# Patient Record
Sex: MALE | Race: OTHER
[De-identification: names, ages, dates, MRNs, and addresses within clinical notes are randomized per-mention and may not be internally consistent; named-entity substitution may affect disease eponyms.]

---

## 2021-03-25 ENCOUNTER — HOSPITAL ENCOUNTER (EMERGENCY)
Dept: HOSPITAL 72 - EMR | Age: 66
Discharge: HOME | End: 2021-03-25
Payer: COMMERCIAL

## 2021-03-25 VITALS — HEIGHT: 69 IN | WEIGHT: 180 LBS | BODY MASS INDEX: 26.66 KG/M2

## 2021-03-25 VITALS — DIASTOLIC BLOOD PRESSURE: 86 MMHG | SYSTOLIC BLOOD PRESSURE: 166 MMHG

## 2021-03-25 VITALS — SYSTOLIC BLOOD PRESSURE: 152 MMHG | DIASTOLIC BLOOD PRESSURE: 72 MMHG

## 2021-03-25 VITALS — DIASTOLIC BLOOD PRESSURE: 69 MMHG | SYSTOLIC BLOOD PRESSURE: 157 MMHG

## 2021-03-25 VITALS — DIASTOLIC BLOOD PRESSURE: 87 MMHG | SYSTOLIC BLOOD PRESSURE: 153 MMHG

## 2021-03-25 DIAGNOSIS — I10: Primary | ICD-10-CM

## 2021-03-25 LAB
ADD MANUAL DIFF: NO
ALBUMIN SERPL-MCNC: 4.1 G/DL (ref 3.4–5)
ALBUMIN/GLOB SERPL: 1.2 {RATIO} (ref 1–2.7)
ALP SERPL-CCNC: 105 U/L (ref 46–116)
ALT SERPL-CCNC: 30 U/L (ref 12–78)
ANION GAP SERPL CALC-SCNC: 9 MMOL/L (ref 5–15)
AST SERPL-CCNC: 17 U/L (ref 15–37)
BASOPHILS NFR BLD AUTO: 1.7 % (ref 0–2)
BILIRUB SERPL-MCNC: 0.5 MG/DL (ref 0.2–1)
BUN SERPL-MCNC: 14 MG/DL (ref 7–18)
CALCIUM SERPL-MCNC: 9.2 MG/DL (ref 8.5–10.1)
CHLORIDE SERPL-SCNC: 104 MMOL/L (ref 98–107)
CO2 SERPL-SCNC: 28 MMOL/L (ref 21–32)
CREAT SERPL-MCNC: 1 MG/DL (ref 0.55–1.3)
EOSINOPHIL NFR BLD AUTO: 2.6 % (ref 0–3)
ERYTHROCYTE [DISTWIDTH] IN BLOOD BY AUTOMATED COUNT: 14.9 % (ref 11.6–14.8)
GLOBULIN SER-MCNC: 3.5 G/DL
HCT VFR BLD CALC: 42.2 % (ref 42–52)
HGB BLD-MCNC: 13.8 G/DL (ref 14.2–18)
LYMPHOCYTES NFR BLD AUTO: 21.3 % (ref 20–45)
MCV RBC AUTO: 87 FL (ref 80–99)
MONOCYTES NFR BLD AUTO: 6.7 % (ref 1–10)
NEUTROPHILS NFR BLD AUTO: 67.7 % (ref 45–75)
PLATELET # BLD: 233 K/UL (ref 150–450)
POTASSIUM SERPL-SCNC: 3.8 MMOL/L (ref 3.5–5.1)
RBC # BLD AUTO: 4.86 M/UL (ref 4.7–6.1)
SODIUM SERPL-SCNC: 141 MMOL/L (ref 136–145)
WBC # BLD AUTO: 11 K/UL (ref 4.8–10.8)

## 2021-03-25 PROCEDURE — 84484 ASSAY OF TROPONIN QUANT: CPT

## 2021-03-25 PROCEDURE — 99284 EMERGENCY DEPT VISIT MOD MDM: CPT

## 2021-03-25 PROCEDURE — 36415 COLL VENOUS BLD VENIPUNCTURE: CPT

## 2021-03-25 PROCEDURE — 71045 X-RAY EXAM CHEST 1 VIEW: CPT

## 2021-03-25 PROCEDURE — 85025 COMPLETE CBC W/AUTO DIFF WBC: CPT

## 2021-03-25 PROCEDURE — 80053 COMPREHEN METABOLIC PANEL: CPT

## 2021-03-25 NOTE — DIAGNOSTIC IMAGING REPORT
Indication: Chest pain

 

Technique: XRAY Chest 1v

 

Comparison: None

 

Findings: Heart size within normal limits for AP technique. Mediastinal contours are

sharp. Atherosclerotic calcifications in the aorta. There is no focal airspace

consolidation, pneumothorax or pleural effusion. Osseous structures demonstrate no

acute abnormality.

 

Impression: No radiographic evidence of acute cardiopulmonary disease.

## 2021-03-25 NOTE — EMERGENCY ROOM REPORT
History of Present Illness


General


Chief Complaint:  Chest Pain


Source:  Patient





Present Illness


HPI


Disclaimer: Please note that this report is being documented using [employer] 

technology. This can lead to erroneous entry secondary to incorrect 

interpretation by the dictating instrument.





HPI: 65-year-old male presents for evaluation of elevated blood pressure 

readings at home.  He has a history of hypertension takes Benzapril daily.  Has 

not missed any doses.  His home blood pressure readings have been ranging in the

170 systolic and in the 80s diastolic.  He states that today while walking he 

felt a little bit lightheaded and tingling in his fingertips and over the front 

of his chest.  Denied chest pain, pressure, palpitations.  No syncope, no 

headache.  Patient was previously on lisinopril and amlodipine but changed to 

Benzapril by his PMD several weeks ago.  Yesterday his Benzapril was increased 

from 10 mg twice daily to 20 mg twice daily.  He drinks 1 cup of coffee daily.  

Denies alcohol, drug use.  Non-smoker.  Denies prior history of CAD, 

hyperlipidemia, diabetes or other medical ailments.  Currently he feels well.  





PMH: Hypertension


 


PSH: Denied


 


Allergies: Denied


 


Social Hx: Denies alcohol, tobacco or drug use


Allergies:  


Coded Allergies:  


     No Known Allergies (Unverified , 3/25/21)





COVID-19 Screening


Contact w/high risk pt:  No


Experienced COVID-19 symptoms?:  No


COVID-19 Testing performed PTA:  No





Nursing Documentation-PMH


Hx Hypertension:  Yes





Review of Systems


All Other Systems:  negative except mentioned in HPI





Physical Exam





Vital Signs








  Date Time  Temp Pulse Resp B/P (MAP) Pulse Ox O2 Delivery O2 Flow Rate FiO2


 


3/25/21 14:03 98.2 66 18 190/107 (134) 99 Room Air  





 





General: Awake and alert, no acute distress


HEENT: NC/AT. EOMI. 


Cardiovascular: RRR.  S1 and S2 normal.  No murmur appreciated


Resp: Normal work of breathing. No cough, wheezing or crackles appreciated


Abdomen: Abdomen is soft, nondistended.  Nontender


Skin: Intact.  No abrasions, laceration or rash over the exposed skin


MSK: Normal tone and bulk. Moving all extremities.  No obvious deformity.


Neuro: Awake and alert.  Mentating appropriately.





Medical Decision Making


Diagnostic Impression:  


   Primary Impression:  


   Hypertension


ER Course


Is a 65-year-old male presenting for elevated blood pressure.  Initially the 

patient was triaged saying he was having chest pain however on discussion he 

states that it was a tingling sensation in his hands and over his chest but this

was short-lived.  He denied any anginal type pain.  His EKG on arrival shows nor

mal sinus rhythm without signs of ischemia.  He arrives hypertensive with 

systolics in the 190s which improved into the 170 systolic on my evaluation.  He

was also given clonidine.  Labs including troponin are within normal limits.  

Chest x-ray unremarkable.  Patient's blood pressure improved.  Low risk 

according to heart score guidelines.  Will follow up with his PMD.  Encouraged 

him to continue keeping track of his blood pressures at home to discuss his 

blood pressures with his PMD to further adjust his medications as needed.  

Instructed to return new or worsening symptoms.  He understands and agrees with 

this treatment plan.





Heart score:


History: 0


EC


Age: 3


Risk factors: 1


Initial troponin: 0


Total: 2


Lab Results Impression





Laboratory Tests








Test


 3/25/21


14:10


 


White Blood Count


 11.0 K/UL


(4.8-10.8)  H


 


Red Blood Count


 4.86 M/UL


(4.70-6.10)


 


Hemoglobin


 13.8 G/DL


(14.2-18.0)  L


 


Hematocrit


 42.2 %


(42.0-52.0)


 


Mean Corpuscular Volume 87 FL (80-99)  


 


Mean Corpuscular Hemoglobin


 28.5 PG


(27.0-31.0)


 


Mean Corpuscular Hemoglobin


Concent 32.8 G/DL


(32.0-36.0)


 


Red Cell Distribution Width


 14.9 %


(11.6-14.8)  H


 


Platelet Count


 233 K/UL


(150-450)


 


Mean Platelet Volume


 8.1 FL


(6.5-10.1)


 


Neutrophils (%) (Auto)


 67.7 %


(45.0-75.0)


 


Lymphocytes (%) (Auto)


 21.3 %


(20.0-45.0)


 


Monocytes (%) (Auto)


 6.7 %


(1.0-10.0)


 


Eosinophils (%) (Auto)


 2.6 %


(0.0-3.0)


 


Basophils (%) (Auto)


 1.7 %


(0.0-2.0)


 


Sodium Level


 141 MMOL/L


(136-145)


 


Potassium Level


 3.8 MMOL/L


(3.5-5.1)


 


Chloride Level


 104 MMOL/L


()


 


Carbon Dioxide Level


 28 MMOL/L


(21-32)


 


Anion Gap


 9 mmol/L


(5-15)


 


Blood Urea Nitrogen


 14 mg/dL


(7-18)


 


Creatinine


 1.0 MG/DL


(0.55-1.30)


 


Estimated Glomerular


Filtration Rate > 60 mL/min


(>60)


 


Glucose Level


 94 MG/DL


()


 


Calcium Level


 9.2 MG/DL


(8.5-10.1)


 


Total Bilirubin


 0.5 MG/DL


(0.2-1.0)


 


Aspartate Amino Transferase


(AST) 17 U/L (15-37)





 


Alanine Aminotransferase (ALT)


 30 U/L (12-78)





 


Alkaline Phosphatase


 105 U/L


()


 


Troponin I


 0.000 ng/mL


(0.000-0.056)


 


Total Protein


 7.6 G/DL


(6.4-8.2)


 


Albumin


 4.1 G/DL


(3.4-5.0)


 


Globulin 3.5 g/dL  


 


Albumin/Globulin Ratio 1.2 (1.0-2.7)  








EKG Diagnostic Results


Troponin ordered:  Yes


When was troponin ordered?:  Mar 25, 2021


EKG Time:  13:06


Rate:  normal


Rhythm:  NSR


ST Segments:  no acute changes


Other Impression


Sinus rhythm, normal axis, normal intervals, no ST segment changes.





Rhythm Strip Diag. Results


Rhythm Strip Time:  13:06


EP Interpretation:  yes


Rate:  70s


Rhythm:  NSR, no PVC's, no ectopy





Chest X-Ray Diagnostic Results


Chest X-Ray Diagnostic Results :  


   Chest X-Ray Ordered:  Yes


   # of Views/Limited/Complete:  1 View


   Indication:  Other - Hypertension


   EP Interpretation:  Yes


   Interpretation:  no consolidation, no effusion, no pneumothorax, no acute 

cardiopulmonary disease


   Impression:  No acute disease


   Electronically Signed by:  Electronically signed by Dr. Wilner Cuevas MD





Last Vital Signs








  Date Time  Temp Pulse Resp B/P (MAP) Pulse Ox O2 Delivery O2 Flow Rate FiO2


 


3/25/21 14:03 98.2 66 18 190/107 (134) 99 Room Air  








Disposition:  HOME, SELF-CARE


Condition:  Stable











Wilner Cuevas MD              Mar 25, 2021 14:32